# Patient Record
Sex: FEMALE | URBAN - METROPOLITAN AREA
[De-identification: names, ages, dates, MRNs, and addresses within clinical notes are randomized per-mention and may not be internally consistent; named-entity substitution may affect disease eponyms.]

---

## 2018-10-11 ENCOUNTER — LAB REQUISITION (OUTPATIENT)
Dept: LAB | Facility: OTHER | Age: 23
End: 2018-10-11

## 2018-10-11 DIAGNOSIS — Z00.00 ANNUAL PHYSICAL EXAM: ICD-10-CM

## 2018-10-11 LAB — HBV SURFACE AB SER RIA-ACNC: REACTIVE

## 2018-10-11 PROCEDURE — 86706 HEP B SURFACE ANTIBODY: CPT | Performed by: PHYSICAL MEDICINE & REHABILITATION

## 2023-11-22 ENCOUNTER — OFFICE VISIT (OUTPATIENT)
Dept: FAMILY MEDICINE CLINIC | Facility: CLINIC | Age: 28
End: 2023-11-22
Payer: COMMERCIAL

## 2023-11-22 VITALS
HEART RATE: 78 BPM | WEIGHT: 98.2 LBS | DIASTOLIC BLOOD PRESSURE: 74 MMHG | OXYGEN SATURATION: 100 % | TEMPERATURE: 98.7 F | SYSTOLIC BLOOD PRESSURE: 116 MMHG | BODY MASS INDEX: 19.28 KG/M2 | HEIGHT: 60 IN

## 2023-11-22 DIAGNOSIS — M54.50 CHRONIC BILATERAL LOW BACK PAIN WITHOUT SCIATICA: Primary | ICD-10-CM

## 2023-11-22 DIAGNOSIS — G89.29 CHRONIC BILATERAL LOW BACK PAIN WITHOUT SCIATICA: Primary | ICD-10-CM

## 2023-11-22 DIAGNOSIS — Z76.89 ENCOUNTER TO ESTABLISH CARE: ICD-10-CM

## 2023-11-22 DIAGNOSIS — F41.9 ANXIETY: ICD-10-CM

## 2023-11-22 PROCEDURE — 1160F RVW MEDS BY RX/DR IN RCRD: CPT | Performed by: NURSE PRACTITIONER

## 2023-11-22 PROCEDURE — 99204 OFFICE O/P NEW MOD 45 MIN: CPT | Performed by: NURSE PRACTITIONER

## 2023-11-22 PROCEDURE — 1159F MED LIST DOCD IN RCRD: CPT | Performed by: NURSE PRACTITIONER

## 2023-11-22 RX ORDER — HYDROXYZINE PAMOATE 25 MG/1
25-50 CAPSULE ORAL 3 TIMES DAILY PRN
COMMUNITY
Start: 2023-08-04

## 2023-11-22 RX ORDER — DULOXETIN HYDROCHLORIDE 20 MG/1
20 CAPSULE, DELAYED RELEASE ORAL DAILY
Qty: 30 CAPSULE | Refills: 1 | Status: SHIPPED | OUTPATIENT
Start: 2023-11-22

## 2023-11-22 RX ORDER — ESCITALOPRAM OXALATE 10 MG/1
10 TABLET ORAL DAILY
COMMUNITY
Start: 2023-08-04 | End: 2023-11-22
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
## 2023-12-15 ENCOUNTER — OFFICE VISIT (OUTPATIENT)
Dept: FAMILY MEDICINE CLINIC | Facility: CLINIC | Age: 28
End: 2023-12-15
Payer: COMMERCIAL

## 2023-12-15 VITALS
DIASTOLIC BLOOD PRESSURE: 82 MMHG | HEIGHT: 60 IN | HEART RATE: 80 BPM | OXYGEN SATURATION: 100 % | SYSTOLIC BLOOD PRESSURE: 134 MMHG | WEIGHT: 96.8 LBS | BODY MASS INDEX: 19.01 KG/M2 | TEMPERATURE: 98.7 F

## 2023-12-15 DIAGNOSIS — Z00.00 ANNUAL PHYSICAL EXAM: Primary | ICD-10-CM

## 2023-12-15 DIAGNOSIS — M54.50 CHRONIC BILATERAL LOW BACK PAIN WITHOUT SCIATICA: ICD-10-CM

## 2023-12-15 DIAGNOSIS — G89.29 CHRONIC BILATERAL LOW BACK PAIN WITHOUT SCIATICA: ICD-10-CM

## 2023-12-15 DIAGNOSIS — F41.9 ANXIETY: ICD-10-CM

## 2023-12-15 NOTE — PROGRESS NOTES
"Chief Complaint  Chronic bilateral low back pain without sciatica (Follow up-has not gotten xray done/Not fasting-states due labs/Started Cymbalta- caused fatigue and insomnia//)    Subjective        Laura Cohn presents to [facility]  History of Present Illness  Patient presents to the office today for an annual physical exam. With chronic lumbar pain she is to have lumbar xray, continue motrin/ice heat application as needed.  Blood pressure controlled at 134/82.  She is up to date on flu vaccine  With anxiety and depression she has been referred to psychiatry. She did not tolerate cymbalta.   She denies si hi           Objective   Vital Signs:  /82 (BP Location: Right arm, Patient Position: Sitting, Cuff Size: Pediatric)   Pulse 80   Temp 98.7 °F (37.1 °C)   Ht 152.4 cm (60\")   Wt 43.9 kg (96 lb 12.8 oz)   SpO2 100%   BMI 18.90 kg/m²   Estimated body mass index is 18.9 kg/m² as calculated from the following:    Height as of this encounter: 152.4 cm (60\").    Weight as of this encounter: 43.9 kg (96 lb 12.8 oz).       BMI is within normal parameters. No other follow-up for BMI required.      Physical Exam  Constitutional:       Appearance: Normal appearance.   HENT:      Head: Normocephalic.      Right Ear: Tympanic membrane, ear canal and external ear normal.      Left Ear: Tympanic membrane, ear canal and external ear normal.      Nose: Nose normal.   Eyes:      Pupils: Pupils are equal, round, and reactive to light.   Cardiovascular:      Rate and Rhythm: Normal rate and regular rhythm.   Pulmonary:      Effort: Pulmonary effort is normal. No respiratory distress.      Breath sounds: Normal breath sounds. No wheezing.   Abdominal:      General: Abdomen is flat.      Palpations: Abdomen is soft. There is no mass.      Tenderness: There is no abdominal tenderness.      Hernia: No hernia is present.   Musculoskeletal:         General: Tenderness present.      Cervical " back: Normal. No spasms or tenderness. Normal range of motion.      Thoracic back: Normal. No spasms or tenderness. Normal range of motion.      Lumbar back: Spasms and tenderness present. No edema or bony tenderness. Decreased range of motion. No scoliosis.   Neurological:      Mental Status: She is alert.   Psychiatric:         Attention and Perception: Attention and perception normal.         Mood and Affect: Mood normal. Mood is anxious. Mood is not depressed or elated. Affect is not labile, blunt, flat, angry, tearful or inappropriate.         Speech: Speech normal.         Behavior: Behavior normal.         Thought Content: Thought content normal.         Cognition and Memory: Cognition and memory normal.         Judgment: Judgment normal.        Result Review :  The following data was reviewed by: LAUREN Blackburn on 12/15/2023:  Common labs          3/2/2023    05:13 8/4/2023    08:16 12/15/2023    11:42   Common Labs   Glucose   76    BUN   4    Creatinine   0.59    Sodium   142    Potassium   4.4    Chloride   101    Calcium   9.4    Total Protein   6.6    Albumin   4.6    Total Bilirubin   0.3    Alkaline Phosphatase   79    AST (SGOT)   13    ALT (SGPT)   11    WBC 9.58     5.79     5.9    Hemoglobin 8.7     13.9     12.5    Hematocrit 26.6     44.0     35.9    Platelets 222     349     347    Total Cholesterol   143    Triglycerides   89    HDL Cholesterol   55    LDL Cholesterol    71    Hemoglobin A1C  5.2           Details          This result is from an external source.                          Assessment and Plan   Diagnoses and all orders for this visit:    1. Annual physical exam (Primary)  -     CBC & Differential  -     Comprehensive Metabolic Panel  -     Lipid Panel  -     TSH    2. Chronic bilateral low back pain without sciatica    3. Anxiety    Counseling was provided on nutrition, physical activity, development, and injury prevention, dental health, and safe sex practices patient  verbalizes understanding no additional questions were asked.           Follow Up   Return in about 6 months (around 6/15/2024), or if symptoms worsen or fail to improve.  Patient was given instructions and counseling regarding her condition or for health maintenance advice. Please see specific information pulled into the AVS if appropriate.

## 2023-12-16 LAB
ALBUMIN SERPL-MCNC: 4.6 G/DL (ref 4–5)
ALBUMIN/GLOB SERPL: 2.3 {RATIO} (ref 1.2–2.2)
ALP SERPL-CCNC: 79 IU/L (ref 44–121)
ALT SERPL-CCNC: 11 IU/L (ref 0–32)
AST SERPL-CCNC: 13 IU/L (ref 0–40)
BASOPHILS # BLD AUTO: 0 X10E3/UL (ref 0–0.2)
BASOPHILS NFR BLD AUTO: 1 %
BILIRUB SERPL-MCNC: 0.3 MG/DL (ref 0–1.2)
BUN SERPL-MCNC: 4 MG/DL (ref 6–20)
BUN/CREAT SERPL: 7 (ref 9–23)
CALCIUM SERPL-MCNC: 9.4 MG/DL (ref 8.7–10.2)
CHLORIDE SERPL-SCNC: 101 MMOL/L (ref 96–106)
CHOLEST SERPL-MCNC: 143 MG/DL (ref 100–199)
CO2 SERPL-SCNC: 25 MMOL/L (ref 20–29)
CREAT SERPL-MCNC: 0.59 MG/DL (ref 0.57–1)
EGFRCR SERPLBLD CKD-EPI 2021: 125 ML/MIN/1.73
EOSINOPHIL # BLD AUTO: 0.1 X10E3/UL (ref 0–0.4)
EOSINOPHIL NFR BLD AUTO: 2 %
ERYTHROCYTE [DISTWIDTH] IN BLOOD BY AUTOMATED COUNT: 12.2 % (ref 11.7–15.4)
GLOBULIN SER CALC-MCNC: 2 G/DL (ref 1.5–4.5)
GLUCOSE SERPL-MCNC: 76 MG/DL (ref 70–99)
HCT VFR BLD AUTO: 35.9 % (ref 34–46.6)
HDLC SERPL-MCNC: 55 MG/DL
HGB BLD-MCNC: 12.5 G/DL (ref 11.1–15.9)
IMM GRANULOCYTES # BLD AUTO: 0 X10E3/UL (ref 0–0.1)
IMM GRANULOCYTES NFR BLD AUTO: 0 %
LDLC SERPL CALC-MCNC: 71 MG/DL (ref 0–99)
LYMPHOCYTES # BLD AUTO: 2.1 X10E3/UL (ref 0.7–3.1)
LYMPHOCYTES NFR BLD AUTO: 35 %
MCH RBC QN AUTO: 28.9 PG (ref 26.6–33)
MCHC RBC AUTO-ENTMCNC: 34.8 G/DL (ref 31.5–35.7)
MCV RBC AUTO: 83 FL (ref 79–97)
MONOCYTES # BLD AUTO: 0.3 X10E3/UL (ref 0.1–0.9)
MONOCYTES NFR BLD AUTO: 4 %
NEUTROPHILS # BLD AUTO: 3.4 X10E3/UL (ref 1.4–7)
NEUTROPHILS NFR BLD AUTO: 58 %
PLATELET # BLD AUTO: 347 X10E3/UL (ref 150–450)
POTASSIUM SERPL-SCNC: 4.4 MMOL/L (ref 3.5–5.2)
PROT SERPL-MCNC: 6.6 G/DL (ref 6–8.5)
RBC # BLD AUTO: 4.33 X10E6/UL (ref 3.77–5.28)
SODIUM SERPL-SCNC: 142 MMOL/L (ref 134–144)
TRIGL SERPL-MCNC: 89 MG/DL (ref 0–149)
TSH SERPL DL<=0.005 MIU/L-ACNC: 0.99 UIU/ML (ref 0.45–4.5)
VLDLC SERPL CALC-MCNC: 17 MG/DL (ref 5–40)
WBC # BLD AUTO: 5.9 X10E3/UL (ref 3.4–10.8)

## 2024-02-09 ENCOUNTER — TELEPHONE (OUTPATIENT)
Dept: FAMILY MEDICINE CLINIC | Facility: CLINIC | Age: 29
End: 2024-02-09
Payer: COMMERCIAL

## 2024-02-09 ENCOUNTER — OFFICE VISIT (OUTPATIENT)
Dept: FAMILY MEDICINE CLINIC | Facility: CLINIC | Age: 29
End: 2024-02-09
Payer: COMMERCIAL

## 2024-02-09 VITALS
HEIGHT: 60 IN | TEMPERATURE: 98.4 F | DIASTOLIC BLOOD PRESSURE: 62 MMHG | OXYGEN SATURATION: 99 % | SYSTOLIC BLOOD PRESSURE: 90 MMHG | BODY MASS INDEX: 19.12 KG/M2 | WEIGHT: 97.4 LBS | HEART RATE: 73 BPM

## 2024-02-09 DIAGNOSIS — B37.31 VAGINAL CANDIDIASIS: Primary | ICD-10-CM

## 2024-02-09 PROCEDURE — 99213 OFFICE O/P EST LOW 20 MIN: CPT

## 2024-02-09 RX ORDER — FLUCONAZOLE 150 MG/1
150 TABLET ORAL ONCE
Qty: 1 TABLET | Refills: 3 | Status: SHIPPED | OUTPATIENT
Start: 2024-02-09 | End: 2024-02-09

## 2024-02-09 RX ORDER — BORIC ACID
600 POWDER (GRAM) MISCELLANEOUS DAILY
Qty: 30 SUPPOSITORY | Refills: 2 | Status: SHIPPED | OUTPATIENT
Start: 2024-02-09

## 2024-02-09 NOTE — TELEPHONE ENCOUNTER
Caller: Laura Cohn    Relationship: Self    Best call back number: [phone]     What medication are you requesting: DIFLUCAN     What are your current symptoms: DISCOMFORT    How long have you been experiencing symptoms: 2-9-24    Have you had these symptoms before:    [x] Yes  [] No    Have you been treated for these symptoms before:   [x] Yes  [] No    If a prescription is needed, what is your preferred pharmacy and phone number: [facility] [address] [facility] [phone] [facility] [phone]      Additional notes:    PATIENT HAS BEEN ON THIS MEDICATION BEFORE FOR A YEAST INFECTION. PATIENT GETS THEM PRETTY REGULARLY. AFTER TAKING OUT IED SHE STARTED GETTING THEM MORE.    PLEASE CALL PATIENT WITH ANY FURTHER QUESTIONS.

## 2024-02-09 NOTE — TELEPHONE ENCOUNTER
HUB okay to read:    Mau needs appointment to treat.     Sydnee is booked but can schedule with another provider or go to urgent care for evaluation.

## 2024-02-09 NOTE — PROGRESS NOTES
"Chief Complaint  Vaginitis (Possible yeast infection )    Subjective        Laura Cohn presents to [facility]  History of Present Illness  Patient is a 28 y.o. female who presents to the office today for vaginal discomfort. She is new to me but a patient of LAUREN Blackburn.  She was last seen by her on 12/15/2023.  She states that she gets frequent yeast infections which usually occur about 3 days before her menstruation.  She complains of itching, burning, and thick white discharge which started yesterday.  She denies any odor.  She is not concerned for any STDs.              Objective   Vital Signs:  BP 90/62 (BP Location: Left arm, Patient Position: Sitting, Cuff Size: Adult)   Pulse 73   Temp 98.4 °F (36.9 °C) (Temporal)   Ht 152.4 cm (60\")   Wt 44.2 kg (97 lb 6.4 oz)   SpO2 99%   BMI 19.02 kg/m²   Estimated body mass index is 19.02 kg/m² as calculated from the following:    Height as of this encounter: 152.4 cm (60\").    Weight as of this encounter: 44.2 kg (97 lb 6.4 oz).       BMI is within normal parameters. No other follow-up for BMI required.      Physical Exam  Constitutional:       Appearance: Normal appearance.   Cardiovascular:      Rate and Rhythm: Normal rate and regular rhythm.      Heart sounds: Normal heart sounds.   Pulmonary:      Effort: Pulmonary effort is normal.      Breath sounds: Normal breath sounds.   Neurological:      Mental Status: She is alert.   Psychiatric:         Mood and Affect: Mood normal.        Result Review :    The following data was reviewed by: LAUREN Kilgore on 02/09/2024:  Common labs          3/2/2023    05:13 8/4/2023    08:16 12/15/2023    11:42   Common Labs   Glucose   76    BUN   4    Creatinine   0.59    Sodium   142    Potassium   4.4    Chloride   101    Calcium   9.4    Total Protein   6.6    Albumin   4.6    Total Bilirubin   0.3    Alkaline Phosphatase   79    AST (SGOT)   13    ALT (SGPT)   11    WBC " 9.58     5.79     5.9    Hemoglobin 8.7     13.9     12.5    Hematocrit 26.6     44.0     35.9    Platelets 222     349     347    Total Cholesterol   143    Triglycerides   89    HDL Cholesterol   55    LDL Cholesterol    71    Hemoglobin A1C  5.2           Details          This result is from an external source.                          Assessment and Plan     Diagnoses and all orders for this visit:    1. Vaginal candidiasis (Primary)  -     Boric Acid suppository Suppository; Insert 1 suppository into the vagina Daily.  Dispense: 30 suppository; Refill: 2  -     fluconazole (Diflucan) 150 MG tablet; Take 1 tablet by mouth 1 (One) Time for 1 dose.  Dispense: 1 tablet; Refill: 3    Recommended a vaginal health probiotic.    Patient agrees with plan of care and understands instructions. Call if worsening symptoms or any problems or concerns.            Follow Up     Return if symptoms worsen or fail to improve.  Patient was given instructions and counseling regarding her condition or for health maintenance advice. Please see specific information pulled into the AVS if appropriate.

## 2024-03-08 ENCOUNTER — TELEMEDICINE (OUTPATIENT)
Dept: PSYCHIATRY | Facility: CLINIC | Age: 29
End: 2024-03-08
Payer: COMMERCIAL

## 2024-03-08 DIAGNOSIS — F41.1 GENERALIZED ANXIETY DISORDER: Primary | ICD-10-CM
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
## 2024-04-12 ENCOUNTER — TELEMEDICINE (OUTPATIENT)
Dept: PSYCHIATRY | Facility: CLINIC | Age: 29
End: 2024-04-12
Payer: COMMERCIAL

## 2024-04-12 DIAGNOSIS — F41.1 GENERALIZED ANXIETY DISORDER: Primary | ICD-10-CM

## 2024-04-12 NOTE — PROGRESS NOTES
Date: 2024  Time In: 11:01 EDT  Time out: 10:55 EDT      This provider is located at [facility], [facility] [place], [place], [facility], using a secure [employer] Video Visit through [employer]. Patient is being seen remotely via telehealth at their home address is located in [place]. Patient stated they are in a secure environment for this session. The patient's condition being diagnosed and treated is appropriate for telemedicine. The provider identified themself as well as their credentials. The patient, or  patient's legal guardian consent to be seen remotely, and when consent is given they understand that the consent allows for patient identifiable information to be sent to a third party as needed. They may refuse to be seen remotely at any time. The electronic data is encrypted and password protected, and the patient's or  legal guardian has been advised of the potential risks to privacy not withstanding such measures.   PT Identifiers used: Name and .    You have chosen to receive care through a telehealth visit.  Do you consent to use a video/audio connection for your medical care today? Yes     Subjective   Laura Cohn is a 28 y.o. female who presents today for follow up    Chief Complaint: Anxiety     History of Present Illness: Client reports a few incidents recently with her mom, that have lead to increased anxiety for the client.Client states that one of these incidents resulted in the client had to call the police because her mom was blocking the door and would not allow the client to leave.  She did eventually unlock the door once the client called the police.The client reports that anytime she sees her mom's name pop up on her phone she feels the anxiety increased.The client reports that typically when she blocks her mom, her mom will eventually find a way to reach out and then will tell the client that she needs to get her personal belongings.  The client reports  that this last time, she gathered her mom's belongings and gave them to her and told her that she would not need to reach back out to her.  The client reports that she has not spoken with her mom since then. The client also discussed her relationship with her boyfriend. Client is unsure whether she feels she wants to continue in that relationship and discussed her history with him.Client shared that it is hard to live with him because he can be very grumpy and seems to have mood swings that make it even more difficult for her.  She states that she really tries to avoid confrontation of all types.    Client shared that dealing with these current situations in her life cause her to feel so emotional and she wants to be able to find a way to manage these things in a healthier way.  She also sees how her mom's mental health has negatively impacted the client and her siblings and does not want to do that to her own family.Client explored in session her mental health issues that she wants to work on.  She states that she notices that if she gets upset, she does not want that to come thru and be directed towards her kids, she wants to find a healthy way to express her emotions and cope with them.    Clinical Maneuvering/Intervention:  On a scale of 0-10 ( with 10 being the worst)  Anxiety: 2/10 ( client reports that she has had a few incidents with her mom recently that have increased her anxiety)  Depression: she states that she is not sure if she feels depressed but describes herself as being very emotional   Sleep:  client reports that she is waking up about every hour because her son wakes her up, and if he does not wake her up she usually wakes up about once around 3 am  Appetite:  no issues to report at this time          Assisted patient in processing above session content; acknowledged and normalized patient’s thoughts, feelings, and concerns.  Rationalized patient thought process regarding concerns presented at  session.  Discussed triggers associated with patient's  anxiety  and depression  Also discussed coping skills for patient to implement such as self care  and positive self talk     Allowed patient to freely discuss issues without interruption or judgment. Provided safe, confidential environment to facilitate the development of positive therapeutic relationship and encourage open, honest communication. Assisted patient in identifying risk factors which would indicate the need for higher level of care including thoughts to harm self or others and/or self-harming behavior and encouraged patient to contact this office, call 911, or present to the nearest emergency room should any of these events occur. Discussed crisis intervention services and means to access. Patient adamantly and convincingly denies current suicidal or homicidal ideation or perceptual disturbance.    Assessment:     Patient appears to maintain relative stability as compared to their baseline.  However, patient continues to struggle with anxiety which continues to cause impairment in important areas of functioning.  A result, they can be reasonably expected to continue to benefit from treatment and would likely be at increased risk for decompensation otherwise.      PHQ-Score Total:  PHQ-9 Total Score:      LANDY-7 Score Total:         Mental Status Exam:   Hygiene:   fair  Cooperation:  Cooperative  Eye Contact:  Good  Psychomotor Behavior:  Appropriate  Affect:  Appropriate  Mood: sad and anxious  Speech:  Normal  Thought Process:  Goal directed and Linear  Thought Content:  Mood congruent  Suicidal:  None  Homicidal:  None  Hallucinations:  None  Delusion:  None  Memory:  Intact  Orientation:  Person, Place, Time, and Situation  Reliability:  good  Insight:  Good  Judgement:  Good  Impulse Control:  Good  Physical/Medical Issues:   No current changes reported        Patient's Support Network Includes:  extended family    Functional Status: Moderate  impairment     Progress toward goal: Not at goal    Prognosis: Fair with Ongoing Treatment         Plan:    Patient will continue in individual outpatient therapy with focus on improved functioning and coping skills, maintaining stability, and avoiding decompensation and the need for higher level of care.    Patient will adhere to medication regimen as prescribed and report any side effects. Patient will contact this office, call 911 or present to the nearest emergency room should suicidal or homicidal ideations occur. Provide Cognitive Behavioral Therapy and Solution Focused Therapy to improve functioning, maintain stability, and avoid decompensation and the need for higher level of care.     Return in about 2 weeks (around 4/26/2024).      VISIT DIAGNOSIS:    Diagnosis Plan   1. Generalized anxiety disorder               This document has been electronically signed by Silva Hardwick LCSW  April 12, 2024      Part of this note may be an electronic transcription/translation of spoken language to printed text using the Dragon Dictation System.

## 2024-04-26 ENCOUNTER — TELEMEDICINE (OUTPATIENT)
Dept: PSYCHIATRY | Facility: CLINIC | Age: 29
End: 2024-04-26
Payer: COMMERCIAL

## 2024-04-26 DIAGNOSIS — F33.0 MILD EPISODE OF RECURRENT MAJOR DEPRESSIVE DISORDER: ICD-10-CM

## 2024-04-26 DIAGNOSIS — F41.1 GENERALIZED ANXIETY DISORDER: Primary | ICD-10-CM

## 2024-04-26 NOTE — PROGRESS NOTES
Date: 2024  Time In: 10:01 EDT  Time out: 10:55 EDT      This provider is located at [facility], [facility] [place], [place], [facility], using a secure [employer] Video Visit through [employer]. Patient is being seen remotely via telehealth at their home address is located in [place]. Patient stated they are in a secure environment for this session. The patient's condition being diagnosed and treated is appropriate for telemedicine. The provider identified themself as well as their credentials. The patient, or  patient's legal guardian consent to be seen remotely, and when consent is given they understand that the consent allows for patient identifiable information to be sent to a third party as needed. They may refuse to be seen remotely at any time. The electronic data is encrypted and password protected, and the patient's or  legal guardian has been advised of the potential risks to privacy not withstanding such measures.   PT Identifiers used: Name and .    You have chosen to receive care through a telehealth visit.  Do you consent to use a video/audio connection for your medical care today? Yes     Subjective   Laura Cohn is a 28 y.o. female who presents today for follow up    Chief Complaint: anxiety, depression, relationship problems     History of Present Illness: client reports that she feels that recently her anxiety has been increased.  She states that the status of her relationship plays a large part in this for her.  She states that there continue to be some ongoing issues between the two of them. She states that he does not really help much with things within the household including the kids.  Therefore in addition to working her job, all of the household activities are the responsibilities fall to the client. Client expressed that her  sees himself as the provider and feels that he can tell the client what and how to do things. Client expressed an  understanding that he is not going to change.She states that in the beginning of the relationship, they never talked about roles or responsibilities. She states that they never really clarified on who would do what around the home.  Client expressed frustration over the current situation. She states that part of her anxiety currently is because of the uncertainty of the relationship. Discussed whether she thought he would attend couple's counseling with her, and client expressed doubt that he would. Client expresses that she also feels a sense of urgency when she is at work to hurry up and get done so that she can get home.  She states that she use to do a few things, such as going to the tanning bed for herself, as a form of self care.  However, this year for [employer] her  gave her a used tanning bed as a [employer]do present.  She states that now she no longer can get out, and does not feel that this tanning bed really works that well. She feels ungrateful because there is a part of her that still wishes she had the reason to get out on her own for just a little while.Client discussed that she really has no time for herself.      Clinical Maneuvering/Intervention:    On a scale 0-10 ( with 10 being the worst)  Anxiety: 5/10 ( highest since last session has been increased at times)  Depression: 4/10    Sleep:  Client reports that she is still getting up and down thru out the night.  She states that part of it has been her kids, but there are other times when she just gets up and looks around and see what time it is.  She reports that it takes her less than 20 minutes to go back to sleep    Appetite:She  states that she has not had much of an appetite recently      Assisted patient in processing above session content; acknowledged and normalized patient’s thoughts, feelings, and concerns.  Rationalized patient thought process regarding concerns presented at session.  Discussed triggers associated with patient's   anxiety , depression , and relationship issues  Also discussed coping skills for patient to implement such as  healthy boundaries    Allowed patient to freely discuss issues without interruption or judgment. Provided safe, confidential environment to facilitate the development of positive therapeutic relationship and encourage open, honest communication. Assisted patient in identifying risk factors which would indicate the need for higher level of care including thoughts to harm self or others and/or self-harming behavior and encouraged patient to contact this office, call 911, or present to the nearest emergency room should any of these events occur. Discussed crisis intervention services and means to access. Patient adamantly and convincingly denies current suicidal or homicidal ideation or perceptual disturbance.    Assessment:     Patient appears to maintain relative stability as compared to their baseline.  However, patient continues to struggle with anxiety, depression, relationship issues which continues to cause impairment in important areas of functioning.  A result, they can be reasonably expected to continue to benefit from treatment and would likely be at increased risk for decompensation otherwise.      PHQ-Score Total:  PHQ-9 Total Score:      LANDY-7 Score Total:         Mental Status Exam:   Hygiene:   good  Cooperation:  Cooperative  Eye Contact:  Good  Psychomotor Behavior:  Appropriate  Affect:  Appropriate  Mood: sad, depressed, and anxious  Speech:  Normal  Thought Process:  Goal directed and Linear  Thought Content:  Mood congruent  Suicidal:  None  Homicidal:  None  Hallucinations:  None  Delusion:  None  Memory:  Intact  Orientation:  Person, Place, Time, and Situation  Reliability:  good  Insight:  Good  Judgement:  Good  Impulse Control:  Good  Physical/Medical Issues:   No current changes        Patient's Support Network Includes:  father, extended family, and friends    Functional Status:  Moderate impairment     Progress toward goal: Not at goal    Prognosis: Fair with Ongoing Treatment         Plan:    Patient will continue in individual outpatient therapy with focus on improved functioning and coping skills, maintaining stability, and avoiding decompensation and the need for higher level of care.    Patient will adhere to medication regimen as prescribed and report any side effects. Patient will contact this office, call 911 or present to the nearest emergency room should suicidal or homicidal ideations occur. Provide Cognitive Behavioral Therapy and Solution Focused Therapy to improve functioning, maintain stability, and avoid decompensation and the need for higher level of care.     Return in about 3 weeks (around 5/17/2024).      VISIT DIAGNOSIS:    Diagnosis Plan   1. Generalized anxiety disorder        2. Mild episode of recurrent major depressive disorder               This document has been electronically signed by Silva Hardwick LCSW  April 26, 2024      Part of this note may be an electronic transcription/translation of spoken language to printed text using the Dragon Dictation System.

## 2024-05-24 ENCOUNTER — TELEMEDICINE (OUTPATIENT)
Dept: PSYCHIATRY | Facility: CLINIC | Age: 29
End: 2024-05-24
Payer: COMMERCIAL

## 2024-05-24 DIAGNOSIS — F41.1 GENERALIZED ANXIETY DISORDER: Primary | ICD-10-CM

## 2024-05-24 DIAGNOSIS — F33.0 MILD EPISODE OF RECURRENT MAJOR DEPRESSIVE DISORDER: ICD-10-CM

## 2024-05-24 NOTE — PROGRESS NOTES
"Date: May 24, 2024  Time In: 08:00 EDT  Time out: 08:54 EDT      This provider is located at [facility], [facility] [place], [place], [facility], using a secure [employer] Video Visit through "[employer]. Patient is being seen remotely via telehealth at their home address is located in [place]. Patient stated they are in a secure environment for this session. The patient's condition being diagnosed and treated is appropriate for telemedicine. The provider identified themself as well as their credentials. The patient, or  patient's legal guardian consent to be seen remotely, and when consent is given they understand that the consent allows for patient identifiable information to be sent to a third party as needed. They may refuse to be seen remotely at any time. The electronic data is encrypted and password protected, and the patient's or  legal guardian has been advised of the potential risks to privacy not withstanding such measures.   PT Identifiers used: Name and .    You have chosen to receive care through a telehealth visit.  Do you consent to use a video/audio connection for your medical care today? Yes     Subjective   Laura Cohn is a 29 y.o. female who presents today for follow up    Chief Complaint: anxiety, depression, relationship issues     History of Present Illness: Client discussed how things have been since last session.  She states that she has had some contact with her mom since last session. She states that she is coming to an \"acceptance\" that her mom is not going to allow herself to get the help that she needs. Client tearful when talking about her mom and their relationship.She also discussed about her relationship with her partner. Client discussed that there was a significant argument between the two of them and this lead to her partner talking about the fact that he knows that he needs to seek out help with his own mental health, however, he has not followed thru with " "this. Client and therapist discussed that the first step for her is to identify what her own needs are and to make taking care of herself and her ental health a priority at this time. Client identified that she knows that she struggles with trying to make sure that everybody is ok and has what they need. Therapist asked client what about her own needs and client responded \"I don't know\" and she was very honest in this response. She states that there are times when she just wants to be alone, and when she tries to take that time for herself, her partner accuses her of being selfish. Client and therapist discussed coming to an \"acceptance\" of who her partner is rather than who she wants him to be or feels that he should be, not because he shouldn't be those things, but because when she is focused on what she thinks he should be rather than how he is, she gets frustrated and upset rather than being able to focus on actual solutions for herself.  Client discussed that she wants to work on identifying symptoms of her anxiety so that she can recongize it and work on calming strategies      Clinical Maneuvering/Intervention:    On a scale 0-10 ( with 10 being the worst)  Anxiety: 8/10  Depression: 8/10    Sleep:  client reports that she is seeing some improvement in her ability to sleep more often thru out the night.  She still wakes up with the baby some    Appetite:  She reports that she finds herself that she will get hooked on a food and eat it over and over again for awhile      Assisted patient in processing above session content; acknowledged and normalized patient’s thoughts, feelings, and concerns.  Rationalized patient thought process regarding concerns presented at session.  Discussed triggers associated with patient's  anxiety , depression , and relationship issues  Also discussed coping skills for patient to implement such as  acceptance, self care    Allowed patient to freely discuss issues without " interruption or judgment. Provided safe, confidential environment to facilitate the development of positive therapeutic relationship and encourage open, honest communication. Assisted patient in identifying risk factors which would indicate the need for higher level of care including thoughts to harm self or others and/or self-harming behavior and encouraged patient to contact this office, call 911, or present to the nearest emergency room should any of these events occur. Discussed crisis intervention services and means to access. Patient adamantly and convincingly denies current suicidal or homicidal ideation or perceptual disturbance.    Assessment:     Patient appears to maintain relative stability as compared to their baseline.  However, patient continues to struggle with anxiety, depression, and relationship issues which continues to cause impairment in important areas of functioning.  A result, they can be reasonably expected to continue to benefit from treatment and would likely be at increased risk for decompensation otherwise.      PHQ-Score Total:  PHQ-9 Total Score:      LANDY-7 Score Total:         Mental Status Exam:   Hygiene:   good  Cooperation:  Cooperative  Eye Contact:  Fair  Psychomotor Behavior:  Appropriate  Affect:  Blunted  Mood: sad, depressed, and anxious  Speech:  Normal  Thought Process:  Goal directed  Thought Content:  Mood congruent  Suicidal:  None  Homicidal:  None  Hallucinations:  None  Delusion:  None  Memory:  Intact  Orientation:  Person, Place, Time, and Situation  Reliability:  good  Insight:  Good  Judgement:  Good  Impulse Control:  Good  Physical/Medical Issues:   No current changes reported        Patient's Support Network Includes:  father and extended family    Functional Status: Moderate impairment     Progress toward goal: Not at goal    Prognosis: Fair with Ongoing Treatment         Plan:    Patient will continue in individual outpatient therapy with focus on improved  functioning and coping skills, maintaining stability, and avoiding decompensation and the need for higher level of care.    Patient will adhere to medication regimen as prescribed and report any side effects. Patient will contact this office, call 911 or present to the nearest emergency room should suicidal or homicidal ideations occur. Provide Cognitive Behavioral Therapy and Solution Focused Therapy to improve functioning, maintain stability, and avoid decompensation and the need for higher level of care.     Return in about 2 weeks (around 6/7/2024).      VISIT DIAGNOSIS:    Diagnosis Plan   1. Generalized anxiety disorder        2. Mild episode of recurrent major depressive disorder               This document has been electronically signed by Silva Hardwick LCSW  May 24, 2024      Part of this note may be an electronic transcription/translation of spoken language to printed text using the Dragon Dictation System.

## 2024-06-07 ENCOUNTER — TELEMEDICINE (OUTPATIENT)
Dept: PSYCHIATRY | Facility: CLINIC | Age: 29
End: 2024-06-07
Payer: COMMERCIAL

## 2024-06-07 DIAGNOSIS — F33.0 MILD EPISODE OF RECURRENT MAJOR DEPRESSIVE DISORDER: ICD-10-CM

## 2024-06-07 DIAGNOSIS — F41.1 GENERALIZED ANXIETY DISORDER: Primary | ICD-10-CM

## 2024-06-07 NOTE — PROGRESS NOTES
Date: 2024  Time In: 09:02 EDT  Time out: 09:56 EDT      This provider is located at [facility] [place] [facility], [facility] [place], [place], [facility], using a secure Netragonhart Video Visit through [employer]. Patient is being seen remotely via telehealth at their home address is located in [place]. Patient stated they are in a secure environment for this session. The patient's condition being diagnosed and treated is appropriate for telemedicine. The provider identified themself as well as their credentials. The patient, or  patient's legal guardian consent to be seen remotely, and when consent is given they understand that the consent allows for patient identifiable information to be sent to a third party as needed. They may refuse to be seen remotely at any time. The electronic data is encrypted and password protected, and the patient's or  legal guardian has been advised of the potential risks to privacy not withstanding such measures.   PT Identifiers used: Name and .    You have chosen to receive care through a telehealth visit.  Do you consent to use a video/audio connection for your medical care today? Yes     Subjective   Laura Cohn is a 29 y.o. female who presents today for follow up    Chief Complaint: anxiety, depression, relationship issues, boundaries     History of Present Illness: Client reports that when her spouse is home she is a little more anxious.  She states that this is because she is anticipating an argument or disagreements. She states that recently he has been trying to be different and easier with her and the kids.She states that this only serves to make her feel guilty because she is not sure that this changes anything for her.  She feels almost as though it is too little to late.She states that she also finds her anxiety increases when her phone rings because she worries that it will be her mom reaching out to her.Client and therapist discussed current boundaries with  "mom, and client identified that she has gotten better about identifying need and setting bounaries especially with mom. Discussed challenges to this and beginning to identify strategies for it      Clinical Maneuvering/Intervention:    On a scale 0-10 ( with 10 being the worst)  Anxiety: 5/10  Depression: 4/10    Sleep:  Client reports that she finds herself waking up multiple times during the night, she will get up for a few minutes and then lay back down. She and therapist did discuss that in the past this was related to her youngest, but he is now sleeping thru the night    Appetite:  She states that she will find herself \"stuck\" on one food for a little while and then will find another food      Assisted patient in processing above session content; acknowledged and normalized patient’s thoughts, feelings, and concerns.  Rationalized patient thought process regarding concerns presented at session.  Discussed triggers associated with patient's   anxiety, depression, relationship issues, boundaries  Also discussed coping skills for patient to implement such as  healthy boundaries    Allowed patient to freely discuss issues without interruption or judgment. Provided safe, confidential environment to facilitate the development of positive therapeutic relationship and encourage open, honest communication. Assisted patient in identifying risk factors which would indicate the need for higher level of care including thoughts to harm self or others and/or self-harming behavior and encouraged patient to contact this office, call 911, or present to the nearest emergency room should any of these events occur. Discussed crisis intervention services and means to access. Patient adamantly and convincingly denies current suicidal or homicidal ideation or perceptual disturbance.    Assessment:     Patient appears to maintain relative stability as compared to their baseline.  However, patient continues to struggle with anxiety, " depression, relationship issues, boundaries which continues to cause impairment in important areas of functioning.  A result, they can be reasonably expected to continue to benefit from treatment and would likely be at increased risk for decompensation otherwise.         Mental Status Exam:   Hygiene:   good  Cooperation:  Cooperative  Eye Contact:  Good  Psychomotor Behavior:  Appropriate  Affect:  Appropriate  Mood: depressed and anxious  Speech:  Normal  Thought Process:  Goal directed and Linear  Thought Content:  Normal and Mood congruent  Suicidal:  None  Homicidal:  None  Hallucinations:  None  Delusion:  None  Memory:  Intact  Orientation:  Person, Place, Time, and Situation  Reliability:  good  Insight:  Good  Judgement:  Good  Impulse Control:  Good  Physical/Medical Issues:   No current changes reported        Patient's Support Network Includes:  extended family    Functional Status: Moderate impairment     Progress toward goal: Not at goal    Prognosis: Fair with Ongoing Treatment         Plan:    Patient will continue in individual outpatient therapy with focus on improved functioning and coping skills, maintaining stability, and avoiding decompensation and the need for higher level of care.    Patient will adhere to medication regimen as prescribed and report any side effects. Patient will contact this office, call 911 or present to the nearest emergency room should suicidal or homicidal ideations occur. Provide Cognitive Behavioral Therapy and Solution Focused Therapy to improve functioning, maintain stability, and avoid decompensation and the need for higher level of care.     Return in about 2 weeks (around 6/21/2024).      VISIT DIAGNOSIS:    Diagnosis Plan   1. Generalized anxiety disorder        2. Mild episode of recurrent major depressive disorder               This document has been electronically signed by Silva Hardwick LCSW  June 7, 2024      Part of this note may be an electronic  transcription/translation of spoken language to printed text using the Dragon Dictation System.

## 2024-06-21 ENCOUNTER — TELEMEDICINE (OUTPATIENT)
Dept: PSYCHIATRY | Facility: CLINIC | Age: 29
End: 2024-06-21
Payer: COMMERCIAL

## 2024-06-21 DIAGNOSIS — F33.0 MILD EPISODE OF RECURRENT MAJOR DEPRESSIVE DISORDER: ICD-10-CM

## 2024-06-21 DIAGNOSIS — F41.1 GENERALIZED ANXIETY DISORDER: Primary | ICD-10-CM

## 2024-06-21 PROCEDURE — 90837 PSYTX W PT 60 MINUTES: CPT | Performed by: SOCIAL WORKER

## 2024-06-21 NOTE — PROGRESS NOTES
Date: 2024  Time In: 08:02 EDT  Time out: 08:55 EDT      This provider is located at [facility], [facility] [place], [place], [facility], using a secure [employer] Video Visit through [employer]. Patient is being seen remotely via telehealth at their home address is located in [place]. Patient stated they are in a secure environment for this session. The patient's condition being diagnosed and treated is appropriate for telemedicine. The provider identified themself as well as their credentials. The patient, or  patient's legal guardian consent to be seen remotely, and when consent is given they understand that the consent allows for patient identifiable information to be sent to a third party as needed. They may refuse to be seen remotely at any time. The electronic data is encrypted and password protected, and the patient's or  legal guardian has been advised of the potential risks to privacy not withstanding such measures.   PT Identifiers used: Name and .    You have chosen to receive care through a telehealth visit.  Do you consent to use a video/audio connection for your medical care today? Yes     Subjective   Laura Cohn is a 29 y.o. female who presents today for follow up    Chief Complaint: Anxiety and Depression , relationship issues    History of Present Illness: Client reports that she finds herself waking up during the night and getting up and checking on things thru out the house, she may check the front door to make sure that it is locked, she will check on the kids to make sure where they are as they sometimes get up and move around during the night. She reports that she is getting up at least twice each night, and there are times when it takes a minimum of at least 10 minutes to fall back to sleep, others it will take much longer. She states that if she has had a very anxious day she will really struggle with sleep  Client reports that last session, there has been an  "\"incident\".  She states that her  recently got a boat and stated that the family could not go to the beach.  Client stated that she told him that she and the kids were going to the beach.She reports that a few days later he brought it back up in a very angry manner and told her that it was a \"really shitty thing\" for her to do to him. She states that he made several little snarky comments and is now giving her the silent treatment for the last few days.She is adamant that she does not feel he would hurt her physically, but admits that he has anger issues.  Client and therapist discussed that even though she does not feel that he would hurt her physically, it is important for her to have a plan for where she would go, and she states that if something were to happen she could go and stay with her sister.      Clinical Maneuvering/Intervention:    On a scale 0-10 ( with 10 being the worst)  Anxiety: 8/10 currently ( feels it is high because of the current tension with her spouse  Depression: 4/10    Sleep:  waking up frequently during the night,     Appetite:  Client reports that eating has been \"ok\", notices that she continues to  get fixated on a certain food for a little while, she thinks that within the last week she has noticed her eating has been less      Assisted patient in processing above session content; acknowledged and normalized patient’s thoughts, feelings, and concerns.  Rationalized patient thought process regarding concerns presented at session.  Discussed triggers associated with patient's  anxiety  and depression  Also discussed coping skills for patient to implement such as  strategies for managing current stressors/anxiety    Allowed patient to freely discuss issues without interruption or judgment. Provided safe, confidential environment to facilitate the development of positive therapeutic relationship and encourage open, honest communication. Assisted patient in identifying risk factors " which would indicate the need for higher level of care including thoughts to harm self or others and/or self-harming behavior and encouraged patient to contact this office, call 911, or present to the nearest emergency room should any of these events occur. Discussed crisis intervention services and means to access. Patient adamantly and convincingly denies current suicidal or homicidal ideation or perceptual disturbance.    Assessment:     Patient appears to maintain relative stability as compared to their baseline.  However, patient continues to struggle with Anxiety and depression  which continues to cause impairment in important areas of functioning.  A result, they can be reasonably expected to continue to benefit from treatment and would likely be at increased risk for decompensation otherwise.         Mental Status Exam:   Hygiene:   fair  Cooperation:  Cooperative  Eye Contact:  Good  Psychomotor Behavior:  Appropriate  Affect:  Appropriate  Mood: sad, depressed, and anxious  Speech:  Normal  Thought Process:  Goal directed and Linear  Thought Content:  Mood congruent  Suicidal:  None  Homicidal:  None  Hallucinations:  None  Delusion:  None  Memory:  Intact  Orientation:  Person, Place, Time, and Situation  Reliability:  good  Insight:  Good  Judgement:  Good  Impulse Control:  Good  Physical/Medical Issues:   No current changes reported        Patient's Support Network Includes:  , children, father, and extended family    Functional Status: Moderate impairment     Progress toward goal: Not at goal    Prognosis: Fair with Ongoing Treatment         Plan:    Patient will continue in individual outpatient therapy with focus on improved functioning and coping skills, maintaining stability, and avoiding decompensation and the need for higher level of care.    Patient will adhere to medication regimen as prescribed and report any side effects. Patient will contact this office, call 911 or present to the  nearest emergency room should suicidal or homicidal ideations occur. Provide Cognitive Behavioral Therapy and Solution Focused Therapy to improve functioning, maintain stability, and avoid decompensation and the need for higher level of care.     Return in about 2 weeks (around 7/5/2024).      VISIT DIAGNOSIS:    Diagnosis Plan   1. Generalized anxiety disorder        2. Mild episode of recurrent major depressive disorder               This document has been electronically signed by Silva Hardwick LCSW  June 21, 2024      Part of this note may be an electronic transcription/translation of spoken language to printed text using the Dragon Dictation System.

## 2024-07-12 ENCOUNTER — TELEMEDICINE (OUTPATIENT)
Dept: PSYCHIATRY | Facility: CLINIC | Age: 29
End: 2024-07-12
Payer: COMMERCIAL

## 2024-07-12 DIAGNOSIS — F41.1 GENERALIZED ANXIETY DISORDER: Primary | ICD-10-CM

## 2024-07-12 DIAGNOSIS — F33.0 MILD EPISODE OF RECURRENT MAJOR DEPRESSIVE DISORDER: ICD-10-CM

## 2024-07-12 NOTE — PROGRESS NOTES
Date: 2024  Time In: 08:01 EDT  Time out: 08:54 EDT      This provider is located at [facility], [facility] [place], [place], [facility], using a secure Call[employer]hart Video Visit through [employer]. Patient is being seen remotely via telehealth at their home address is located in [place]. Patient stated they are in a secure environment for this session. The patient's condition being diagnosed and treated is appropriate for telemedicine. The provider identified themself as well as their credentials. The patient, or  patient's legal guardian consent to be seen remotely, and when consent is given they understand that the consent allows for patient identifiable information to be sent to a third party as needed. They may refuse to be seen remotely at any time. The electronic data is encrypted and password protected, and the patient's or  legal guardian has been advised of the potential risks to privacy not withstanding such measures.   PT Identifiers used: Name and .    You have chosen to receive care through a telehealth visit.  Do you consent to use a video/audio connection for your medical care today? Yes     Subjective   Laura Cohn is a 29 y.o. female who presents today for follow up    Chief Complaint: Anxiety and depression, relationship issues     History of Present Illness: Client discussed how things have been since last session.  Client and therapist processed how things have been with her mood since last session.  She discussed that the relationship problems have continued since her return home from vacation with her family.Client and therapist discussed the ongoing struggles with her partner and her feelings of not being appreciated for the part that she contributes to their home and taking care of things there.client and therapist discussed implementing boundaries with him and the consequences of that.she has been working to set boundaries with him, but he is not always  "respectful of those boundaries.  She discussed that she has to decide what to do.She finds herself growing tired of trying when she feels that he is not putting in as much work into the relationship as she is and also not respecting the few small boundaries that she is trying. She described a recent outing on their boat when she told him that she was scared, and rather than being comforting to her, he became critical of her.When she told one of the other girls on the outing about how she felt and how he had talked to her, they validated her feelings. Client reports that her anxiety has been increased recently.  She states that she has noticed some increased in problems with focus and concentration.Client has noticed some signs of withdrawal from others.  She states that she is also nervous and anxious about an upcoming birthday party for her son.  We processed the factors increasing her anxiety about the party.      Clinical Maneuvering/Intervention:    On a scale 0-10 ( with 10 being the worst)  Anxiety: 6/10 Currently  Depression: 3/10 currently    Sleep:  She reports that she slept \"ok\" while she was on vacation.    Appetite: No current eating issues      Assisted patient in processing above session content; acknowledged and normalized patient’s thoughts, feelings, and concerns.  Rationalized patient thought process regarding concerns presented at session.  Discussed triggers associated with patient's  anxiety , depression , and relationship issues  Also discussed coping skills for patient to implement such as  boundaries    Allowed patient to freely discuss issues without interruption or judgment. Provided safe, confidential environment to facilitate the development of positive therapeutic relationship and encourage open, honest communication. Assisted patient in identifying risk factors which would indicate the need for higher level of care including thoughts to harm self or others and/or self-harming behavior " and encouraged patient to contact this office, call 911, or present to the nearest emergency room should any of these events occur. Discussed crisis intervention services and means to access. Patient adamantly and convincingly denies current suicidal or homicidal ideation or perceptual disturbance.    Assessment:     Patient appears to maintain relative stability as compared to their baseline.  However, patient continues to struggle with anxiety, depression, and relationship issues which continues to cause impairment in important areas of functioning.  A result, they can be reasonably expected to continue to benefit from treatment and would likely be at increased risk for decompensation otherwise.         Mental Status Exam:   Hygiene:   good  Cooperation:  Cooperative  Eye Contact:  Good  Psychomotor Behavior:  Appropriate  Affect:  Appropriate  Mood: sad, depressed, and anxious  Speech:  Normal  Thought Process:  Goal directed and Linear  Thought Content:  Mood congruent  Suicidal:  None  Homicidal:  None  Hallucinations:  None  Delusion:  None  Memory:   No current changes reported at this time  Orientation:  Person, Place, Time, and Situation  Reliability:  good  Insight:  Good  Judgement:  Good  Impulse Control:  Good  Physical/Medical Issues:   No current changes reported        Patient's Support Network Includes:  significant other, father, and extended family    Functional Status: Moderate impairment     Progress toward goal: Not at goal    Prognosis: Fair with Ongoing Treatment         Plan:    Patient will continue in individual outpatient therapy with focus on improved functioning and coping skills, maintaining stability, and avoiding decompensation and the need for higher level of care.    Patient will adhere to medication regimen as prescribed and report any side effects. Patient will contact this office, call 911 or present to the nearest emergency room should suicidal or homicidal ideations occur.  Provide Cognitive Behavioral Therapy and Solution Focused Therapy to improve functioning, maintain stability, and avoid decompensation and the need for higher level of care.     Return in about 2 weeks (around 7/26/2024).      VISIT DIAGNOSIS:    Diagnosis Plan   1. Generalized anxiety disorder        2. Mild episode of recurrent major depressive disorder               This document has been electronically signed by Silva Hardwick LCSW  July 12, 2024      Part of this note may be an electronic transcription/translation of spoken language to printed text using the Dragon Dictation System.

## 2024-07-26 ENCOUNTER — TELEMEDICINE (OUTPATIENT)
Dept: PSYCHIATRY | Facility: CLINIC | Age: 29
End: 2024-07-26
Payer: COMMERCIAL

## 2024-07-26 DIAGNOSIS — F33.0 MILD EPISODE OF RECURRENT MAJOR DEPRESSIVE DISORDER: ICD-10-CM

## 2024-07-26 DIAGNOSIS — F41.1 GENERALIZED ANXIETY DISORDER: Primary | ICD-10-CM

## 2024-07-26 PROCEDURE — 90837 PSYTX W PT 60 MINUTES: CPT | Performed by: SOCIAL WORKER

## 2024-07-26 NOTE — PROGRESS NOTES
Date: 2024  Time In: 09:59 EDT  Time out: 10:54 EDT      This provider is located at [facility], [address] [place] [address], [facility], using a secure [employer] Video Visit through [employer]. Patient is being seen remotely via telehealth at their home address is located in [place]. Patient stated they are in a secure environment for this session. The patient's condition being diagnosed and treated is appropriate for telemedicine. The provider identified themself as well as their credentials. The patient, or  patient's legal guardian consent to be seen remotely, and when consent is given they understand that the consent allows for patient identifiable information to be sent to a third party as needed. They may refuse to be seen remotely at any time. The electronic data is encrypted and password protected, and the patient's or  legal guardian has been advised of the potential risks to privacy not withstanding such measures.   PT Identifiers used: Name and .    You have chosen to receive care through a telehealth visit.  Do you consent to use a video/audio connection for your medical care today? Yes     Subjective   Laura Cohn is a 29 y.o. female who presents today for follow up    Chief Complaint: anxiety, depression, relationship issues     History of Present Illness: Client discussed how things have been since last session. Client discussed ongoing issues within her relationship.  Feels that she is getting better about setting and keeping boundaries with her SO, however, states that she feels herself feeling more and more removed emotionally from her spouse.  Discussed the anxiety caused by relationship issues for  her.      Clinical Maneuvering/Intervention:    On a scale 0-10 ( with 10 being the worst)  Anxiety: 5/10  Depression: 6/10    Sleep:  No current changes reported     Appetite: No current eating issues      Assisted patient in processing above session content;  acknowledged and normalized patient’s thoughts, feelings, and concerns.  Rationalized patient thought process regarding concerns presented at session.  Discussed triggers associated with patient's  anxiety , depression , and relationship issues  Also discussed coping skills for patient to implement such as self care  and positive self talk     Allowed patient to freely discuss issues without interruption or judgment. Provided safe, confidential environment to facilitate the development of positive therapeutic relationship and encourage open, honest communication. Assisted patient in identifying risk factors which would indicate the need for higher level of care including thoughts to harm self or others and/or self-harming behavior and encouraged patient to contact this office, call 911, or present to the nearest emergency room should any of these events occur. Discussed crisis intervention services and means to access. Patient adamantly and convincingly denies current suicidal or homicidal ideation or perceptual disturbance.    Assessment:     Patient appears to maintain relative stability as compared to their baseline.  However, patient continues to struggle with anxiety, depression, relationship issues which continues to cause impairment in important areas of functioning.  A result, they can be reasonably expected to continue to benefit from treatment and would likely be at increased risk for decompensation otherwise.         Mental Status Exam:   Hygiene:   good  Cooperation:  Cooperative  Eye Contact:  Good  Psychomotor Behavior:  Appropriate  Affect:  Appropriate  Mood: depressed and anxious  Speech:  Normal  Thought Process:  Goal directed and Linear  Thought Content:  Mood congruent  Suicidal:  None  Homicidal:  None  Hallucinations:  None  Delusion:  None  Memory:   No current changes reported   Orientation:  Person, Place, Time, and Situation  Reliability:  good  Insight:  Good  Judgement:  Good  Impulse  Control:  Good  Physical/Medical Issues:   No current changes reported        Patient's Support Network Includes:  children, parents, and extended family    Functional Status: Moderate impairment     Progress toward goal: Not at goal    Prognosis: Fair with Ongoing Treatment         Plan:    Patient will continue in individual outpatient therapy with focus on improved functioning and coping skills, maintaining stability, and avoiding decompensation and the need for higher level of care.    Patient will adhere to medication regimen as prescribed and report any side effects. Patient will contact this office, call 911 or present to the nearest emergency room should suicidal or homicidal ideations occur. Provide Cognitive Behavioral Therapy and Solution Focused Therapy to improve functioning, maintain stability, and avoid decompensation and the need for higher level of care.     Return in about 2 weeks (around 8/9/2024).      VISIT DIAGNOSIS:    Diagnosis Plan   1. Generalized anxiety disorder        2. Mild episode of recurrent major depressive disorder               This document has been electronically signed by Silva Hardwick LCSW  July 29, 2024      Part of this note may be an electronic transcription/translation of spoken language to printed text using the Dragon Dictation System.

## 2024-08-09 ENCOUNTER — TELEMEDICINE (OUTPATIENT)
Dept: PSYCHIATRY | Facility: CLINIC | Age: 29
End: 2024-08-09
Payer: COMMERCIAL

## 2024-08-09 DIAGNOSIS — F33.0 MILD EPISODE OF RECURRENT MAJOR DEPRESSIVE DISORDER: ICD-10-CM

## 2024-08-09 DIAGNOSIS — F41.1 GENERALIZED ANXIETY DISORDER: Primary | ICD-10-CM

## 2024-08-09 NOTE — PROGRESS NOTES
Date: 2024  Time In: 10:01 EDT  Time out: 10:56 EDT      This provider is located at [facility], [facility] [place], [place], [facility], using a secure RECCYhart Video Visit through [employer]. Patient is being seen remotely via telehealth at their home address is located in [place]. Patient stated they are in a secure environment for this session. The patient's condition being diagnosed and treated is appropriate for telemedicine. The provider identified themself as well as their credentials. The patient, or  patient's legal guardian consent to be seen remotely, and when consent is given they understand that the consent allows for patient identifiable information to be sent to a third party as needed. They may refuse to be seen remotely at any time. The electronic data is encrypted and password protected, and the patient's or  legal guardian has been advised of the potential risks to privacy not withstanding such measures.   PT Identifiers used: Name and .    You have chosen to receive care through a telehealth visit.  Do you consent to use a video/audio connection for your medical care today? Yes     Subjective   Laura Cohn is a 29 y.o. female who presents today for follow up    Chief Complaint: Anxiety and Depression     History of Present Illness: Client discussed how things have been since last session.Client discussed current stressors within relationship with .  Described how his actins affect her anxiety and depression.  Client encouraged to focus on her thoughts and feeligns, and actions.  Client processed automatic thoughts and challenges to negative thoughts, processed recent event at work which increased anxiety and depresion for her      Clinical Maneuvering/Intervention:    On a scale 0-10 ( with 10 being the worst)  Anxiety: 7/10  Depression: 5/10    Sleep:  No current changes reported    Appetite:  No current changes reported      Assisted patient in  processing above session content; acknowledged and normalized patient’s thoughts, feelings, and concerns.  Rationalized patient thought process regarding concerns presented at session.  Discussed triggers associated with patient's  anxiety  and depression  Also discussed coping skills for patient to implement such as  processing negative thoughts    Allowed patient to freely discuss issues without interruption or judgment. Provided safe, confidential environment to facilitate the development of positive therapeutic relationship and encourage open, honest communication. Assisted patient in identifying risk factors which would indicate the need for higher level of care including thoughts to harm self or others and/or self-harming behavior and encouraged patient to contact this office, call 911, or present to the nearest emergency room should any of these events occur. Discussed crisis intervention services and means to access. Patient adamantly and convincingly denies current suicidal or homicidal ideation or perceptual disturbance.    Assessment:     Patient appears to maintain relative stability as compared to their baseline.  However, patient continues to struggle with anxiety and depression  which continues to cause impairment in important areas of functioning.  A result, they can be reasonably expected to continue to benefit from treatment and would likely be at increased risk for decompensation otherwise.         Mental Status Exam:   Hygiene:   good  Cooperation:  Cooperative  Eye Contact:  Good  Psychomotor Behavior:  Appropriate  Affect:  Appropriate  Mood: sad, depressed, and anxious  Speech:  Normal  Thought Process:  Goal directed and Linear  Thought Content:  Mood congruent  Suicidal:  None  Homicidal:  None  Hallucinations:  None  Delusion:  None  Memory:   Intact  Orientation:  Person, Place, Time, and Situation  Reliability:  good  Insight:  Good  Judgement:  Good  Impulse Control:   Good  Physical/Medical Issues:   No current issues reported        Patient's Support Network Includes:  children, parents, and extended family    Functional Status: Moderate impairment     Progress toward goal: Not at goal    Prognosis: Fair with Ongoing Treatment         Plan:    Patient will continue in individual outpatient therapy with focus on improved functioning and coping skills, maintaining stability, and avoiding decompensation and the need for higher level of care.    Patient will adhere to medication regimen as prescribed and report any side effects. Patient will contact this office, call 911 or present to the nearest emergency room should suicidal or homicidal ideations occur. Provide Cognitive Behavioral Therapy and Solution Focused Therapy to improve functioning, maintain stability, and avoid decompensation and the need for higher level of care.     Return in about 2 weeks (around 8/23/2024).      VISIT DIAGNOSIS:    Diagnosis Plan   1. Generalized anxiety disorder        2. Mild episode of recurrent major depressive disorder               This document has been electronically signed by Silva Hardwick LCSW  August 9, 2024      Part of this note may be an electronic transcription/translation of spoken language to printed text using the Dragon Dictation System.

## 2025-01-07 ENCOUNTER — TELEMEDICINE (OUTPATIENT)
Dept: FAMILY MEDICINE CLINIC | Facility: TELEHEALTH | Age: 30
End: 2025-01-07
Payer: COMMERCIAL

## 2025-01-07 DIAGNOSIS — B37.31 VAGINAL YEAST INFECTION: Primary | ICD-10-CM

## 2025-01-07 RX ORDER — FLUCONAZOLE 150 MG/1
150 TABLET ORAL
Qty: 2 TABLET | Refills: 0 | Status: SHIPPED | OUTPATIENT
Start: 2025-01-07 | End: 2025-01-11

## 2025-01-07 NOTE — PROGRESS NOTES
CHIEF COMPLAINT  Chief Complaint   Patient presents with    Vaginitis         HPI  Laura Cohn is a 29 y.o. female  presents with complaint of vaginal yeast infection that started 2-3 days ago. She has these frequently. She is having itching and thick white discharge. She was unable to get the boric acid suppositories.     Review of Systems   Constitutional:  Negative for chills, diaphoresis, fatigue and fever.   Genitourinary:  Positive for vaginal discharge. Negative for decreased urine volume, difficulty urinating, dysuria, enuresis, flank pain, frequency, genital sores, hematuria, menstrual problem, pelvic pain, urgency, vaginal bleeding and vaginal pain.       History reviewed. No pertinent past medical history.    Family History   Problem Relation Age of Onset    Cancer Mother         Breast    Schizophrenia Mother     Hypertension Father     Heart disease Father     Chromosomal disorder Sister     Other Sister         Born with one kidney    Diabetes Brother         Born with one Kidney    Schizophrenia Maternal Grandmother        Social History     Socioeconomic History    Marital status: Single   Tobacco Use    Smoking status: Never     Passive exposure: Never    Smokeless tobacco: Never   Vaping Use    Vaping status: Never Used   Substance and Sexual Activity    Alcohol use: Not Currently    Drug use: Not Currently    Sexual activity: Yes     Partners: Male     Birth control/protection: I.U.D.         Breastfeeding No     PHYSICAL EXAM  Physical Exam   Constitutional: She is oriented to person, place, and time. She appears well-developed and well-nourished. She does not have a sickly appearance. She does not appear ill. No distress.   HENT:   Head: Normocephalic and atraumatic.   Eyes: EOM are normal.   Neck: Neck normal appearance.  Pulmonary/Chest: Effort normal.  No respiratory distress.  Neurological: She is alert and oriented to person, place, and time.   Skin: Skin is dry.   Psychiatric: She has a  normal mood and affect.           Diagnoses and all orders for this visit:    1. Vaginal yeast infection (Primary)    Other orders  -     fluconazole (Diflucan) 150 MG tablet; Take 1 tablet by mouth Every 3 (Three) Days for 2 doses.  Dispense: 2 tablet; Refill: 0        Mode of visit: Video   Myself and Laura Cohn participated in this visit. The patient is located in [address] [place] [address]. I am located in [place], [place]. Mychart and Twilio were utilized.   You have chosen to receive care through a telehealth visit.     Does the patient consent to use a video/audio connection for your medical care today? Yes       Note Disclaimer: At [facility], we believe that sharing information builds trust and better   relationships. You are receiving this note because you recently visited [facility]. It is possible you   will see health information before a provider has talked with you about it. This kind of information can   be easy to misunderstand. To help you fully understand what it means for your health, we urge you to   discuss this note with your provider.    Reanna Crews, LAUREN  01/07/2025  15:06 EST

## 2025-01-07 NOTE — PATIENT INSTRUCTIONS
Keep area clean and dry   If symptoms do not improve in 3-5 days, follow up at urgent care for testing       Vaginal Yeast Infection, Adult  Vaginal yeast infection is a condition that causes vaginal discharge as well as soreness, swelling, and redness (inflammation) of the vagina. This is a common condition. Some women get this infection frequently.  What are the causes?  This condition is caused by a change in the normal balance of the yeast (Candida) and normal bacteria that live in the vagina. This change causes an overgrowth of yeast, which causes the inflammation.  What increases the risk?  The condition is more likely to develop in women who:  Take antibiotic medicines.  Have diabetes.  Take birth control pills.  Are pregnant.  Douche often.  Have a weak body defense system (immune system).  Have been taking steroid medicines for a long time.  Frequently wear tight clothing.  What are the signs or symptoms?  Symptoms of this condition include:  White, thick, creamy vaginal discharge.  Swelling, itching, redness, and irritation of the vagina. The lips of the vagina (labia) may be affected as well.  Pain or a burning feeling while urinating.  Pain during sex.  How is this diagnosed?  This condition is diagnosed based on:  Your medical history.  A physical exam.  A pelvic exam. Your health care provider will examine a sample of your vaginal discharge under a microscope. Your health care provider may send this sample for testing to confirm the diagnosis.  How is this treated?  This condition is treated with medicine. Medicines may be over-the-counter or prescription. You may be told to use one or more of the following:  Medicine that is taken by mouth (orally).  Medicine that is applied as a cream (topically).  Medicine that is inserted directly into the vagina (suppository).  Follow these instructions at home:  Take or apply over-the-counter and prescription medicines only as told by your health care  provider.  Do not use tampons until your health care provider approves.  Do not have sex until your infection has cleared. Sex can prolong or worsen your symptoms of infection. Ask your health care provider when it is safe to resume sexual activity.  Keep all follow-up visits. This is important.  How is this prevented?  Do not wear tight clothes, such as pantyhose or tight pants.  Wear breathable cotton underwear.  Do not use douches, perfumed soap, creams, or powders.  Wipe from front to back after using the toilet.  If you have diabetes, keep your blood sugar levels under control.  Ask your health care provider for other ways to prevent yeast infections.  Contact a health care provider if:  You have a fever.  Your symptoms go away and then return.  Your symptoms do not get better with treatment.  Your symptoms get worse.  You have new symptoms.  You develop blisters in or around your vagina.  You have blood coming from your vagina and it is not your menstrual period.  You develop pain in your abdomen.  Summary  Vaginal yeast infection is a condition that causes discharge as well as soreness, swelling, and redness (inflammation) of the vagina.  This condition is treated with medicine. Medicines may be over-the-counter or prescription.  Take or apply over-the-counter and prescription medicines only as told by your health care provider.  Do not douche. Resume sexual activity or use of tampons as instructed by your health care provider.  Contact a health care provider if your symptoms do not get better with treatment or your symptoms go away and then return.  This information is not intended to replace advice given to you by your health care provider. Make sure you discuss any questions you have with your health care provider.  Document Revised: 03/07/2022 Document Reviewed: 03/07/2022  Elsevier Patient Education © 2024 [employer] Inc[employer]

## 2025-03-16 ENCOUNTER — TELEMEDICINE (OUTPATIENT)
Dept: FAMILY MEDICINE CLINIC | Facility: TELEHEALTH | Age: 30
End: 2025-03-16
Payer: COMMERCIAL

## 2025-03-16 DIAGNOSIS — N76.0 ACUTE VAGINITIS: Primary | ICD-10-CM

## 2025-03-16 RX ORDER — FLUCONAZOLE 150 MG/1
TABLET ORAL
Qty: 2 TABLET | Refills: 0 | Status: SHIPPED | OUTPATIENT
Start: 2025-03-16

## 2025-03-16 NOTE — PROGRESS NOTES
Subjective   Chief Complaint   Patient presents with    Vaginitis       Laura Cohn is a 29 y.o. female.     History of Present Illness  Patient reports vaginal itching, rawness and irritation that started in the past few days.  Symptoms feel similar to a vaginal yeast infection, she gets them frequently.  Vaginal Itching  The patient's primary symptoms include genital itching and vaginal discharge. The patient's pertinent negatives include no genital lesions, genital odor, genital rash, missed menses, pelvic pain or vaginal bleeding. This is a new problem. Episode onset: few days. The problem has been unchanged. Pertinent negatives include no abdominal pain, anorexia, back pain, chills, constipation, diarrhea, discolored urine, dysuria, fever, flank pain, frequency, headaches, hematuria, joint pain, joint swelling, nausea, painful intercourse, rash, sore throat, urgency or vomiting. The vaginal discharge was white. No, her partner does not have an STD. The maximum temperature recorded prior to her arrival was no fever.        No Known Allergies    History reviewed. No pertinent past medical history.    History reviewed. No pertinent surgical history.    Social History     Socioeconomic History    Marital status: Single   Tobacco Use    Smoking status: Never     Passive exposure: Never    Smokeless tobacco: Never   Vaping Use    Vaping status: Never Used   Substance and Sexual Activity    Alcohol use: Not Currently    Drug use: Not Currently    Sexual activity: Yes     Partners: Male     Birth control/protection: I.U.D.       Family History   Problem Relation Age of Onset    Cancer Mother         Breast    Schizophrenia Mother     Hypertension Father     Heart disease Father     Chromosomal disorder Sister     Other Sister         Born with one kidney    Diabetes Brother         Born with one Kidney    Schizophrenia Maternal Grandmother          Current Outpatient Medications:     naloxone (NARCAN) 4 MG/0.1ML  nasal spray, , Disp: , Rfl:     fluconazole (Diflucan) 150 MG tablet, Take 1 tablet now and repeat in 3 days x 1., Disp: 2 tablet, Rfl: 0      Review of Systems   Constitutional:  Negative for chills, diaphoresis, fatigue and fever.   HENT:  Negative for sore throat.    Gastrointestinal:  Negative for abdominal pain, anorexia, constipation, diarrhea, nausea and vomiting.   Genitourinary:  Positive for vaginal discharge. Negative for dysuria, flank pain, frequency, hematuria, missed menses, pelvic pain and urgency.   Musculoskeletal:  Negative for back pain and joint pain.   Skin:  Negative for rash.        There were no vitals filed for this visit.    Objective   Physical Exam  Constitutional:       General: She is not in acute distress.     Appearance: Normal appearance. She is not ill-appearing, toxic-appearing or diaphoretic.   HENT:      Head: Normocephalic.      Mouth/Throat:      Lips: Pink.      Mouth: Mucous membranes are moist.   Pulmonary:      Effort: Pulmonary effort is normal.   Abdominal:      Tenderness: There is no abdominal tenderness (per pt).   Neurological:      Mental Status: She is alert and oriented to person, place, and time.          Procedures     Assessment & Plan   Diagnoses and all orders for this visit:    1. Acute vaginitis (Primary)  -     fluconazole (Diflucan) 150 MG tablet; Take 1 tablet now and repeat in 3 days x 1.  Dispense: 2 tablet; Refill: 0            PLAN: Discussed dosing, side effects, recommended other symptomatic care.  Patient should follow up with primary care provider, Urgent Care or ER if symptoms worsen, fail to resolve or other symptoms need attention. Patient/family agree to the above.         LAUREN Barclay     Mode of Visit: Video  Location of patient: -HOME-  Location of provider: +HOME+  You have chosen to receive care through a telehealth visit.  The patient has signed the video visit consent form.  The visit included audio and video interaction. No  technical issues occurred during this visit.    The use of a video visit has been reviewed with the patient and verbal informed consent has been obtained. Myself and Laura Cohn participated in this visit. The patient is located at [address] [place] [address]. I am located in [place], [place]. Mychart and Zoom were utilized.        This visit was performed via Telehealth.  This patient has been instructed to follow-up with their primary care provider if their symptoms worsen or the treatment provided does not resolve their illness.

## 2025-04-29 ENCOUNTER — TELEMEDICINE (OUTPATIENT)
Dept: FAMILY MEDICINE CLINIC | Facility: TELEHEALTH | Age: 30
End: 2025-04-29
Payer: COMMERCIAL

## 2025-04-29 DIAGNOSIS — N89.8 VAGINAL DISCHARGE: Primary | ICD-10-CM

## 2025-04-29 RX ORDER — FLUCONAZOLE 150 MG/1
150 TABLET ORAL
Qty: 2 TABLET | Refills: 0 | Status: SHIPPED | OUTPATIENT
Start: 2025-04-29

## 2025-04-30 NOTE — PROGRESS NOTES
NISREEN Cohn is a 29 y.o. female  presents with complaint of symptoms starting 2 days ago including vaginal discharge, raw feeling and itching. Feels similiar to past yeast infections. Denies fever, chills, sweats, pregnancy.     Review of Systems    No past medical history on file.    Family History   Problem Relation Age of Onset    Cancer Mother         Breast    Schizophrenia Mother     Hypertension Father     Heart disease Father     Chromosomal disorder Sister     Other Sister         Born with one kidney    Diabetes Brother         Born with one Kidney    Schizophrenia Maternal Grandmother        Social History     Socioeconomic History    Marital status: Single   Tobacco Use    Smoking status: Never     Passive exposure: Never    Smokeless tobacco: Never   Vaping Use    Vaping status: Never Used   Substance and Sexual Activity    Alcohol use: Not Currently    Drug use: Not Currently    Sexual activity: Yes     Partners: Male     Birth control/protection: I.U.D.         There were no vitals taken for this visit.    PHYSICAL EXAM  Physical Exam   Constitutional: She appears well-developed and well-nourished.   HENT:   Head: Normocephalic.   Nose: Nose normal.   Neck: Neck normal appearance.  Pulmonary/Chest: Effort normal.   Neurological: She is alert.   Psychiatric: She has a normal mood and affect. Her speech is normal.       Diagnoses and all orders for this visit:    1. Vaginal discharge (Primary)  -     fluconazole (Diflucan) 150 MG tablet; Take 1 tablet by mouth Every 72 (Seventy-Two) Hours As Needed (yeast).  Dispense: 2 tablet; Refill: 0          FOLLOW-UP  As discussed during visit with [facility], if symptoms worsen or fail to improve, follow-up with PCP/Urgent Care/Emergency Department.    Patient verbalizes understanding of medications, instructions for treatment and follow-up.    Karrie Montero, APRN  04/29/2025  20:50 EDT      Mode of Visit: Video  Location of patient:  -HOME-  Location of provider: Home  You have chosen to receive care through a telehealth visit.  The patient has signed the video visit consent form.  The visit included audio and video interaction. No technical issues occurred during this visit.

## 2025-07-01 ENCOUNTER — TELEMEDICINE (OUTPATIENT)
Dept: FAMILY MEDICINE CLINIC | Facility: TELEHEALTH | Age: 30
End: 2025-07-01
Payer: COMMERCIAL

## 2025-07-01 DIAGNOSIS — N89.8 VAGINAL DISCHARGE: Primary | ICD-10-CM

## 2025-07-01 PROCEDURE — 1159F MED LIST DOCD IN RCRD: CPT | Performed by: NURSE PRACTITIONER

## 2025-07-01 PROCEDURE — 99213 OFFICE O/P EST LOW 20 MIN: CPT | Performed by: NURSE PRACTITIONER

## 2025-07-01 PROCEDURE — 1160F RVW MEDS BY RX/DR IN RCRD: CPT | Performed by: NURSE PRACTITIONER

## 2025-07-01 RX ORDER — FLUCONAZOLE 150 MG/1
150 TABLET ORAL
Qty: 2 TABLET | Refills: 0 | Status: SHIPPED | OUTPATIENT
Start: 2025-07-01

## 2025-07-01 NOTE — PROGRESS NOTES
NISREEN Cohn is a 30 y.o. female  presents with complaint of vaginal irritation and white discharge. She reports she just returned from vacation and was in a wet swimsuit for extended periods of time. Denies pregnancy. Has history of yeast infections and this feels similar.     Review of Systems    No past medical history on file.    Family History   Problem Relation Age of Onset    Cancer Mother         Breast    Schizophrenia Mother     Hypertension Father     Heart disease Father     Chromosomal disorder Sister     Other Sister         Born with one kidney    Diabetes Brother         Born with one Kidney    Schizophrenia Maternal Grandmother        Social History     Socioeconomic History    Marital status: Single   Tobacco Use    Smoking status: Never     Passive exposure: Never    Smokeless tobacco: Never   Vaping Use    Vaping status: Never Used   Substance and Sexual Activity    Alcohol use: Not Currently    Drug use: Not Currently    Sexual activity: Yes     Partners: Male     Birth control/protection: I.U.D.         There were no vitals taken for this visit.    PHYSICAL EXAM  Physical Exam   Constitutional: She appears well-developed and well-nourished.   HENT:   Head: Normocephalic.   Nose: Nose normal.   Neck: Neck normal appearance.  Pulmonary/Chest: Effort normal.   Neurological: She is alert.   Psychiatric: She has a normal mood and affect. Her speech is normal.       Diagnoses and all orders for this visit:    1. Vaginal discharge (Primary)  -     fluconazole (Diflucan) 150 MG tablet; Take 1 tablet by mouth Every 72 (Seventy-Two) Hours As Needed (yeast).  Dispense: 2 tablet; Refill: 0          FOLLOW-UP  As discussed during visit with [facility], if symptoms worsen or fail to improve, follow-up with PCP/Urgent Care/Emergency Department.    Patient verbalizes understanding of medications, instructions for treatment and follow-up.    Karrie Montero, APRN  07/01/2025  16:53  EDT      Mode of Visit: Video  Location of patient: -HOME-  Location of provider: Home  You have chosen to receive care through a telehealth visit.  The patient has signed the video visit consent form.  The visit included audio and video interaction. No technical issues occurred during this visit.